# Patient Record
Sex: MALE | Race: WHITE | NOT HISPANIC OR LATINO | URBAN - METROPOLITAN AREA
[De-identification: names, ages, dates, MRNs, and addresses within clinical notes are randomized per-mention and may not be internally consistent; named-entity substitution may affect disease eponyms.]

---

## 2021-09-08 ENCOUNTER — EMERGENCY (EMERGENCY)
Facility: HOSPITAL | Age: 56
LOS: 1 days | Discharge: HOME | End: 2021-09-08
Attending: EMERGENCY MEDICINE
Payer: MEDICARE

## 2021-09-08 VITALS
DIASTOLIC BLOOD PRESSURE: 94 MMHG | SYSTOLIC BLOOD PRESSURE: 156 MMHG | TEMPERATURE: 99 F | RESPIRATION RATE: 19 BRPM | OXYGEN SATURATION: 96 % | HEIGHT: 69 IN | WEIGHT: 177.03 LBS | HEART RATE: 98 BPM

## 2021-09-08 LAB
A1C WITH ESTIMATED AVERAGE GLUCOSE RESULT: 10.7 % — HIGH (ref 4–5.6)
ACETONE SERPL-MCNC: ABNORMAL
ALBUMIN SERPL ELPH-MCNC: 4.3 G/DL — SIGNIFICANT CHANGE UP (ref 3.3–5.2)
ALP SERPL-CCNC: 105 U/L — SIGNIFICANT CHANGE UP (ref 40–120)
ALT FLD-CCNC: 26 U/L — SIGNIFICANT CHANGE UP
ANION GAP SERPL CALC-SCNC: 18 MMOL/L — HIGH (ref 5–17)
ANION GAP SERPL CALC-SCNC: 21 MMOL/L — HIGH (ref 5–17)
APPEARANCE UR: CLEAR — SIGNIFICANT CHANGE UP
AST SERPL-CCNC: 16 U/L — SIGNIFICANT CHANGE UP
BASE EXCESS BLDV CALC-SCNC: -7.2 MMOL/L — LOW (ref -2–3)
BASOPHILS # BLD AUTO: 0.04 K/UL — SIGNIFICANT CHANGE UP (ref 0–0.2)
BASOPHILS NFR BLD AUTO: 0.4 % — SIGNIFICANT CHANGE UP (ref 0–2)
BILIRUB SERPL-MCNC: 0.8 MG/DL — SIGNIFICANT CHANGE UP (ref 0.4–2)
BILIRUB UR-MCNC: NEGATIVE — SIGNIFICANT CHANGE UP
BUN SERPL-MCNC: 10 MG/DL — SIGNIFICANT CHANGE UP (ref 8–20)
BUN SERPL-MCNC: 11 MG/DL — SIGNIFICANT CHANGE UP (ref 8–20)
CALCIUM SERPL-MCNC: 8.4 MG/DL — LOW (ref 8.6–10.2)
CALCIUM SERPL-MCNC: 9.7 MG/DL — SIGNIFICANT CHANGE UP (ref 8.6–10.2)
CHLORIDE SERPL-SCNC: 100 MMOL/L — SIGNIFICANT CHANGE UP (ref 98–107)
CHLORIDE SERPL-SCNC: 97 MMOL/L — LOW (ref 98–107)
CO2 SERPL-SCNC: 16 MMOL/L — LOW (ref 22–29)
CO2 SERPL-SCNC: 17 MMOL/L — LOW (ref 22–29)
COLOR SPEC: YELLOW — SIGNIFICANT CHANGE UP
CREAT SERPL-MCNC: 0.78 MG/DL — SIGNIFICANT CHANGE UP (ref 0.5–1.3)
CREAT SERPL-MCNC: 0.9 MG/DL — SIGNIFICANT CHANGE UP (ref 0.5–1.3)
DIFF PNL FLD: ABNORMAL
EOSINOPHIL # BLD AUTO: 0.1 K/UL — SIGNIFICANT CHANGE UP (ref 0–0.5)
EOSINOPHIL NFR BLD AUTO: 1.1 % — SIGNIFICANT CHANGE UP (ref 0–6)
EPI CELLS # UR: SIGNIFICANT CHANGE UP
ESTIMATED AVERAGE GLUCOSE: 260 MG/DL — HIGH (ref 68–114)
GAS PNL BLDV: SIGNIFICANT CHANGE UP
GLUCOSE BLDC GLUCOMTR-MCNC: 179 MG/DL — HIGH (ref 70–99)
GLUCOSE SERPL-MCNC: 176 MG/DL — HIGH (ref 70–99)
GLUCOSE SERPL-MCNC: 273 MG/DL — HIGH (ref 70–99)
GLUCOSE UR QL: 1000 MG/DL
HCO3 BLDV-SCNC: 19 MMOL/L — LOW (ref 22–29)
HCT VFR BLD CALC: 44.3 % — SIGNIFICANT CHANGE UP (ref 39–50)
HGB BLD-MCNC: 16.1 G/DL — SIGNIFICANT CHANGE UP (ref 13–17)
HYALINE CASTS # UR AUTO: ABNORMAL /LPF
IMM GRANULOCYTES NFR BLD AUTO: 0.8 % — SIGNIFICANT CHANGE UP (ref 0–1.5)
KETONES UR-MCNC: ABNORMAL
LEUKOCYTE ESTERASE UR-ACNC: NEGATIVE — SIGNIFICANT CHANGE UP
LYMPHOCYTES # BLD AUTO: 1.97 K/UL — SIGNIFICANT CHANGE UP (ref 1–3.3)
LYMPHOCYTES # BLD AUTO: 21.4 % — SIGNIFICANT CHANGE UP (ref 13–44)
MAGNESIUM SERPL-MCNC: 1.7 MG/DL — SIGNIFICANT CHANGE UP (ref 1.6–2.6)
MAGNESIUM SERPL-MCNC: 1.9 MG/DL — SIGNIFICANT CHANGE UP (ref 1.6–2.6)
MCHC RBC-ENTMCNC: 31.9 PG — SIGNIFICANT CHANGE UP (ref 27–34)
MCHC RBC-ENTMCNC: 36.3 GM/DL — HIGH (ref 32–36)
MCV RBC AUTO: 87.9 FL — SIGNIFICANT CHANGE UP (ref 80–100)
MONOCYTES # BLD AUTO: 0.55 K/UL — SIGNIFICANT CHANGE UP (ref 0–0.9)
MONOCYTES NFR BLD AUTO: 6 % — SIGNIFICANT CHANGE UP (ref 2–14)
NEUTROPHILS # BLD AUTO: 6.49 K/UL — SIGNIFICANT CHANGE UP (ref 1.8–7.4)
NEUTROPHILS NFR BLD AUTO: 70.3 % — SIGNIFICANT CHANGE UP (ref 43–77)
NITRITE UR-MCNC: NEGATIVE — SIGNIFICANT CHANGE UP
PCO2 BLDV: 39 MMHG — LOW (ref 42–55)
PH BLDV: 7.3 — LOW (ref 7.32–7.43)
PH UR: 5 — SIGNIFICANT CHANGE UP (ref 5–8)
PHOSPHATE SERPL-MCNC: 2.5 MG/DL — SIGNIFICANT CHANGE UP (ref 2.4–4.7)
PLATELET # BLD AUTO: 178 K/UL — SIGNIFICANT CHANGE UP (ref 150–400)
PO2 BLDV: 65 MMHG — HIGH (ref 25–45)
POTASSIUM SERPL-MCNC: 3.9 MMOL/L — SIGNIFICANT CHANGE UP (ref 3.5–5.3)
POTASSIUM SERPL-MCNC: 4.2 MMOL/L — SIGNIFICANT CHANGE UP (ref 3.5–5.3)
POTASSIUM SERPL-SCNC: 3.9 MMOL/L — SIGNIFICANT CHANGE UP (ref 3.5–5.3)
POTASSIUM SERPL-SCNC: 4.2 MMOL/L — SIGNIFICANT CHANGE UP (ref 3.5–5.3)
PROT SERPL-MCNC: 7.6 G/DL — SIGNIFICANT CHANGE UP (ref 6.6–8.7)
PROT UR-MCNC: 30 MG/DL
RBC # BLD: 5.04 M/UL — SIGNIFICANT CHANGE UP (ref 4.2–5.8)
RBC # FLD: 12.2 % — SIGNIFICANT CHANGE UP (ref 10.3–14.5)
RBC CASTS # UR COMP ASSIST: SIGNIFICANT CHANGE UP /HPF (ref 0–4)
SAO2 % BLDV: 93.5 % — SIGNIFICANT CHANGE UP
SODIUM SERPL-SCNC: 133 MMOL/L — LOW (ref 135–145)
SODIUM SERPL-SCNC: 135 MMOL/L — SIGNIFICANT CHANGE UP (ref 135–145)
SP GR SPEC: 1.02 — SIGNIFICANT CHANGE UP (ref 1.01–1.02)
UROBILINOGEN FLD QL: NEGATIVE MG/DL — SIGNIFICANT CHANGE UP
WBC # BLD: 9.22 K/UL — SIGNIFICANT CHANGE UP (ref 3.8–10.5)
WBC # FLD AUTO: 9.22 K/UL — SIGNIFICANT CHANGE UP (ref 3.8–10.5)
WBC UR QL: SIGNIFICANT CHANGE UP

## 2021-09-08 PROCEDURE — 99244 OFF/OP CNSLTJ NEW/EST MOD 40: CPT

## 2021-09-08 PROCEDURE — 99291 CRITICAL CARE FIRST HOUR: CPT

## 2021-09-08 RX ORDER — POTASSIUM CHLORIDE 20 MEQ
40 PACKET (EA) ORAL ONCE
Refills: 0 | Status: COMPLETED | OUTPATIENT
Start: 2021-09-08 | End: 2021-09-08

## 2021-09-08 RX ORDER — DEXTROSE 50 % IN WATER 50 %
15 SYRINGE (ML) INTRAVENOUS ONCE
Refills: 0 | Status: DISCONTINUED | OUTPATIENT
Start: 2021-09-08 | End: 2021-09-13

## 2021-09-08 RX ORDER — SODIUM CHLORIDE 9 MG/ML
2000 INJECTION, SOLUTION INTRAVENOUS ONCE
Refills: 0 | Status: COMPLETED | OUTPATIENT
Start: 2021-09-08 | End: 2021-09-08

## 2021-09-08 RX ORDER — INSULIN GLARGINE 100 [IU]/ML
15 INJECTION, SOLUTION SUBCUTANEOUS ONCE
Refills: 0 | Status: COMPLETED | OUTPATIENT
Start: 2021-09-08 | End: 2021-09-08

## 2021-09-08 RX ORDER — DEXTROSE MONOHYDRATE, SODIUM CHLORIDE, AND POTASSIUM CHLORIDE 50; .745; 4.5 G/1000ML; G/1000ML; G/1000ML
1000 INJECTION, SOLUTION INTRAVENOUS
Refills: 0 | Status: DISCONTINUED | OUTPATIENT
Start: 2021-09-08 | End: 2021-09-08

## 2021-09-08 RX ORDER — SODIUM CHLORIDE 9 MG/ML
1000 INJECTION, SOLUTION INTRAVENOUS ONCE
Refills: 0 | Status: COMPLETED | OUTPATIENT
Start: 2021-09-08 | End: 2021-09-08

## 2021-09-08 RX ORDER — DEXTROSE 50 % IN WATER 50 %
25 SYRINGE (ML) INTRAVENOUS ONCE
Refills: 0 | Status: DISCONTINUED | OUTPATIENT
Start: 2021-09-08 | End: 2021-09-13

## 2021-09-08 RX ORDER — SODIUM CHLORIDE 9 MG/ML
1000 INJECTION, SOLUTION INTRAVENOUS
Refills: 0 | Status: DISCONTINUED | OUTPATIENT
Start: 2021-09-08 | End: 2021-09-13

## 2021-09-08 RX ORDER — GLUCAGON INJECTION, SOLUTION 0.5 MG/.1ML
1 INJECTION, SOLUTION SUBCUTANEOUS ONCE
Refills: 0 | Status: DISCONTINUED | OUTPATIENT
Start: 2021-09-08 | End: 2021-09-13

## 2021-09-08 RX ORDER — METFORMIN HYDROCHLORIDE 850 MG/1
500 TABLET ORAL
Refills: 0 | Status: DISCONTINUED | OUTPATIENT
Start: 2021-09-08 | End: 2021-09-08

## 2021-09-08 RX ORDER — INSULIN LISPRO 100/ML
VIAL (ML) SUBCUTANEOUS
Refills: 0 | Status: DISCONTINUED | OUTPATIENT
Start: 2021-09-08 | End: 2021-09-13

## 2021-09-08 RX ORDER — DEXTROSE 50 % IN WATER 50 %
12.5 SYRINGE (ML) INTRAVENOUS ONCE
Refills: 0 | Status: DISCONTINUED | OUTPATIENT
Start: 2021-09-08 | End: 2021-09-13

## 2021-09-08 RX ORDER — INSULIN HUMAN 100 [IU]/ML
8 INJECTION, SOLUTION SUBCUTANEOUS
Qty: 100 | Refills: 0 | Status: DISCONTINUED | OUTPATIENT
Start: 2021-09-08 | End: 2021-09-08

## 2021-09-08 RX ORDER — INSULIN HUMAN 100 [IU]/ML
8 INJECTION, SOLUTION SUBCUTANEOUS ONCE
Refills: 0 | Status: COMPLETED | OUTPATIENT
Start: 2021-09-08 | End: 2021-09-08

## 2021-09-08 RX ADMIN — INSULIN HUMAN 8 UNIT(S)/HR: 100 INJECTION, SOLUTION SUBCUTANEOUS at 18:57

## 2021-09-08 RX ADMIN — SODIUM CHLORIDE 1000 MILLILITER(S): 9 INJECTION, SOLUTION INTRAVENOUS at 18:57

## 2021-09-08 RX ADMIN — INSULIN HUMAN 8 UNIT(S): 100 INJECTION, SOLUTION SUBCUTANEOUS at 18:19

## 2021-09-08 RX ADMIN — SODIUM CHLORIDE 1000 MILLILITER(S): 9 INJECTION, SOLUTION INTRAVENOUS at 18:19

## 2021-09-08 RX ADMIN — Medication 2: at 23:15

## 2021-09-08 RX ADMIN — SODIUM CHLORIDE 1000 MILLILITER(S): 9 INJECTION, SOLUTION INTRAVENOUS at 16:40

## 2021-09-08 RX ADMIN — SODIUM CHLORIDE 150 MILLILITER(S): 9 INJECTION, SOLUTION INTRAVENOUS at 19:32

## 2021-09-08 RX ADMIN — Medication 40 MILLIEQUIVALENT(S): at 19:04

## 2021-09-08 NOTE — CONSULT NOTE ADULT - ASSESSMENT
55M new onset diabetes mild DKA with strong family history of diabetes.  Received 2L IVF and 8units RHI push.  BG down to 176 still receiving IVF bolus, covid pending.      DKA  new onset diabetes  blurred vision    Finish additional 2L IVF bolus start IVF LR 150cc/hr  BG q1h  repeat lytes 9pm if gap unclosed will start RHI gtt and admit to ICU, if closed can transition to sliding scale with standing lantus and tx to floor.  check and replace K Mag Phos as needed  will give lantus 15units now  endocrine consult  ophthalmology consult  MICU will continue to follow  d/w Dr Boss in agreement with plan     55M new onset diabetes mild DKA with strong family history of diabetes.  Received 2L IVF and 8units RHI push.  BG down to 176 still receiving IVF bolus, covid pending.      DKA  new onset diabetes  blurred vision    Finish additional 2L IVF bolus start IVF LR 150cc/hr  BG q1h  repeat lytes 9pm if gap unclosed will start RHI gtt and admit to ICU, if closed can transition to sliding scale with standing lantus and tx to floor.  check and replace K Mag Phos as needed  will give lantus 15units now per Dr. Boss  endocrine consult  ophthalmology consult  MICU will continue to follow  d/w Dr Boss in agreement with plan

## 2021-09-08 NOTE — CONSULT NOTE ADULT - SUBJECTIVE AND OBJECTIVE BOX
REASON FOR CONSULT: new onset DM DKA    CONSULT REQUESTED BY: Dr. Rylan MD    Patient is a 55y old  Male who presents with a chief complaint of new onset diabetes.  Pt reports 2 week history of blurred vision, polyuria, polydipsia.  Presents today for worsening symptoms.  Denies CP SOB N/V/D, HA.    PAST MEDICAL & SURGICAL HISTORY:  No pertinent past medical history    No significant past surgical history      Allergies    Allergy Status Unknown    Intolerances      FAMILY HISTORY:      Review of Systems: all other ros negative except as noted in hpi  CONSTITUTIONAL: No fever, chills, or fatigue  EYES: No eye pain, visual disturbances, or discharge  ENMT:  No difficulty hearing, tinnitus, vertigo; No sinus or throat pain +increase thirst  NECK: No pain or stiffness  RESPIRATORY: No cough, wheezing, chills or hemoptysis; No shortness of breath  CARDIOVASCULAR: No chest pain, palpitations, dizziness, or leg swelling  GASTROINTESTINAL: No abdominal or epigastric pain. No nausea, vomiting, or hematemesis; No diarrhea or constipation. No melena or hematochezia.  GENITOURINARY: No dysuria, frequency, hematuria, or incontinence +frequency  NEUROLOGICAL: No headaches, memory loss, loss of strength, numbness, or tremors  SKIN: No itching, burning, rashes, or lesions   MUSCULOSKELETAL: No joint pain or swelling; No muscle, back, or extremity pain  PSYCHIATRIC: No depression, anxiety, mood swings, or difficulty sleeping      Medications:                    lactated ringers. 1000 milliLiter(s) IV Continuous <Continuous>                ICU Vital Signs Last 24 Hrs  T(C): 36.8 (08 Sep 2021 19:30), Max: 37 (08 Sep 2021 13:53)  T(F): 98.2 (08 Sep 2021 19:30), Max: 98.6 (08 Sep 2021 13:53)  HR: 86 (08 Sep 2021 19:30) (86 - 98)  BP: 177/93 (08 Sep 2021 19:30) (147/78 - 177/93)  BP(mean): --  ABP: --  ABP(mean): --  RR: 20 (08 Sep 2021 19:30) (19 - 20)  SpO2: 98% (08 Sep 2021 19:30) (96% - 98%)    Vital Signs Last 24 Hrs  T(C): 36.8 (08 Sep 2021 19:30), Max: 37 (08 Sep 2021 13:53)  T(F): 98.2 (08 Sep 2021 19:30), Max: 98.6 (08 Sep 2021 13:53)  HR: 86 (08 Sep 2021 19:30) (86 - 98)  BP: 177/93 (08 Sep 2021 19:30) (147/78 - 177/93)  BP(mean): --  RR: 20 (08 Sep 2021 19:30) (19 - 20)  SpO2: 98% (08 Sep 2021 19:30) (96% - 98%)        I&O's Detail        LABS:                        16.1   9.22  )-----------( 178      ( 08 Sep 2021 16:45 )             44.3     09    133<L>  |  97<L>  |  11.0  ----------------------------<  273<H>  4.2   |  16.0<L>  |  0.90    Ca    9.7      08 Sep 2021 16:45  Mg     1.9     09-08    TPro  7.6  /  Alb  4.3  /  TBili  0.8  /  DBili  x   /  AST  16  /  ALT  26  /  AlkPhos  105  09-08          CAPILLARY BLOOD GLUCOSE      POCT Blood Glucose.: 176 mg/dL (08 Sep 2021 19:06)      Urinalysis Basic - ( 08 Sep 2021 16:49 )    Color: Yellow / Appearance: Clear / S.025 / pH: x  Gluc: x / Ketone: Large  / Bili: Negative / Urobili: Negative mg/dL   Blood: x / Protein: 30 mg/dL / Nitrite: Negative   Leuk Esterase: Negative / RBC: 0-2 /HPF / WBC 0-2   Sq Epi: x / Non Sq Epi: Occasional / Bacteria: x      CULTURES:      Physical Examination:    General: No acute distress.  Alert, oriented, interactive, nonfocal    HEENT: Pupils equal, reactive to light.  Symmetric.    PULM: Clear to auscultation bilaterally, no significant sputum production    CVS: Regular rate and rhythm, no murmurs, rubs, or gallops    ABD: Soft, nondistended, nontender, normoactive bowel sounds, no masses    EXT: No edema, nontender    SKIN: Warm and well perfused, no rashes noted.        CRITICAL CARE TIME SPENT: 30 minutes

## 2021-09-08 NOTE — ED ADULT NURSE NOTE - NSIMPLEMENTINTERV_GEN_ALL_ED
Implemented All Fall Risk Interventions:  [place] to call system. Call bell, personal items and telephone within reach. Instruct patient to call for assistance. Room bathroom lighting operational. Non-slip footwear when patient is off stretcher. Physically safe environment: no spills, clutter or unnecessary equipment. Stretcher in lowest position, wheels locked, appropriate side rails in place. Provide visual cue, wrist band, yellow gown, etc. Monitor gait and stability. Monitor for mental status changes and reorient to person, place, and time. Review medications for side effects contributing to fall risk. Reinforce activity limits and safety measures with patient and family. Implemented All Universal Safety Interventions:  [place] to call system. Call bell, personal items and telephone within reach. Instruct patient to call for assistance. Room bathroom lighting operational. Non-slip footwear when patient is off stretcher. Physically safe environment: no spills, clutter or unnecessary equipment. Stretcher in lowest position, wheels locked, appropriate side rails in place.

## 2021-09-08 NOTE — ED CDU PROVIDER INITIAL DAY NOTE - OBJECTIVE STATEMENT
56yo M with no PMH presents to the ED c/o increased thirst, blurry vision and weight loss over the course of the past 2 weeks. Patient with strong family history of DM, never bene diagnosed with DM in the past. Last time he saw his PCP 2 years ago. Patient found to have sugars of 318 in triage. Mild anion gap, with small acetone, PH 7.3. Improvement of sugars to 176 and anion gap closed to 18. Seen by ICU who recommended Lantus 15 units q nightly. Patient without abdominal pain, palpitations, nausea, vomiting, fevers, chills, shortness of breath, chest pain.

## 2021-09-08 NOTE — ED PROVIDER NOTE - CLINICAL SUMMARY MEDICAL DECISION MAKING FREE TEXT BOX
likely new onset DM, well appearing, will check labs with a1c. does not want to stay overnight. if necessary patient wife is RN. will be able to assist in insulin injections at home. and has close outpt Follow up

## 2021-09-08 NOTE — ED PROVIDER NOTE - NS ED ATTENDING STATEMENT MOD
Attending Only I have personally provided the amount of critical care time documented below concurrently with the resident/fellow.  This time excludes time spent on separate procedures and time spent teaching. I have reviewed the resident’s / fellow’s documentation and I agree with the history, exam, and assessment and plan of care. I have personally provided the amount of critical care time documented below excluding time spent on separate procedures.

## 2021-09-08 NOTE — ED PROVIDER NOTE - OBJECTIVE STATEMENT
54yo M with no PMH, last 2 weeks intermittent blurry vision, increased thirst and frequent urination, +FH DM. no abd pain. no CP/SOB. no fevers.

## 2021-09-08 NOTE — ED PROVIDER NOTE - NS ED ROS FT
ROS: no CP/SOB. no cough. no fever. no n/v/d/c. no abd pain. no rash. no bleeding. +urinary complaints. no weakness. +vision changes. no HA. no neck/back pain. no extremity swelling/deformity. No change in mental status.

## 2021-09-08 NOTE — ED ADULT NURSE NOTE - CAS ELECT INFOMATION PROVIDED
DC instructions provided and reviewed with pt by TEJAL Braahona. Patient in understanding of all dc instructions. No further questions for the RN regarding DC. VSS. Ambulatory./DC instructions

## 2021-09-08 NOTE — ED ADULT NURSE NOTE - OBJECTIVE STATEMENT
Assumed care at 1545 pt co weakness, unable to urinate since 0900 am and feeling like he was going to pass out, as per wife pts nurse took his BP and was very low. pt denies any chest pain, SOB, fevers, chills. Assumed care at 1635 pt co excessive thirst, frequent urination for a couple of days, pt denies any fevers, chills, chest pain SOB.

## 2021-09-08 NOTE — ED ADULT TRIAGE NOTE - CHIEF COMPLAINT QUOTE
Pt. c/o increased thirst, weight loss, increased urination and blurred vision x 2 weeks.  Pt. with no history of DM but significant family history of DM.  FS in triage 318

## 2021-09-08 NOTE — ED CDU PROVIDER INITIAL DAY NOTE - ATTENDING CONTRIBUTION TO CARE
I, Charlie Rutherford, performed a face to face bedside interview with this patient regarding history of present illness, review of symptoms and relevant past medical, social and family history.  I completed an independent physical examination. I have communicated the patient’s plan of care and disposition with the ACP.  55 year old male with no PMH presents with polydipsia and weight loss, found to have new onset DM with mild DKA, DKA resolved, placed on obs for glycemic control  Gen: NAD, well appearing  CV: RRR  Pul: CTA b/l  Abd: Soft, non-distended, non-tender  Neuro: no focal deficits

## 2021-09-08 NOTE — ED CDU PROVIDER INITIAL DAY NOTE - MEDICAL DECISION MAKING DETAILS
56yo M with no PMH presents to the ED c/o increased thirst, blurry vision and weight loss over the course of the past 2 weeks.    New onset Type 2 DM- family hx of DM, never been diagnosed last saw PCP 2 years ago. Initial BS of 318, anion gap of 21, small acetone, PH 7.3. Improvement of sugars and anion gap, hemoglobin a1c of 10.7, Seen by ICU, no need for insulin drip at this time, Recommended 15 units of lantus q nightly, will place on sliding scale, DM education, patient to follow up with endocrinology and ophthalmology and Podiatry following discharge, finger stick q 4 hours, repeat bmp in the am, continue on lactated ringers

## 2021-09-08 NOTE — ED PROVIDER NOTE - PROGRESS NOTE DETAILS
patient with mild DKA, will start insulin gtt, D5 gtt, MICU consulted -Rylan DO significant improvement with hydration, IV insulin and no gtt. will give lantus. obs -Slowey DO

## 2021-09-09 VITALS
OXYGEN SATURATION: 97 % | TEMPERATURE: 99 F | HEART RATE: 81 BPM | DIASTOLIC BLOOD PRESSURE: 77 MMHG | SYSTOLIC BLOOD PRESSURE: 129 MMHG | RESPIRATION RATE: 18 BRPM

## 2021-09-09 LAB
ANION GAP SERPL CALC-SCNC: 17 MMOL/L — SIGNIFICANT CHANGE UP (ref 5–17)
BUN SERPL-MCNC: 10.1 MG/DL — SIGNIFICANT CHANGE UP (ref 8–20)
C PEPTIDE SERPL-MCNC: 3.3 NG/ML — SIGNIFICANT CHANGE UP (ref 1.1–4.4)
CALCIUM SERPL-MCNC: 8.3 MG/DL — LOW (ref 8.6–10.2)
CHLORIDE SERPL-SCNC: 99 MMOL/L — SIGNIFICANT CHANGE UP (ref 98–107)
CO2 SERPL-SCNC: 16 MMOL/L — LOW (ref 22–29)
CREAT SERPL-MCNC: 0.76 MG/DL — SIGNIFICANT CHANGE UP (ref 0.5–1.3)
GLUCOSE BLDC GLUCOMTR-MCNC: 170 MG/DL — HIGH (ref 70–99)
GLUCOSE BLDC GLUCOMTR-MCNC: 184 MG/DL — HIGH (ref 70–99)
GLUCOSE BLDC GLUCOMTR-MCNC: 215 MG/DL — HIGH (ref 70–99)
GLUCOSE SERPL-MCNC: 241 MG/DL — HIGH (ref 70–99)
POTASSIUM SERPL-MCNC: 4.6 MMOL/L — SIGNIFICANT CHANGE UP (ref 3.5–5.3)
POTASSIUM SERPL-SCNC: 4.6 MMOL/L — SIGNIFICANT CHANGE UP (ref 3.5–5.3)
SARS-COV-2 RNA SPEC QL NAA+PROBE: SIGNIFICANT CHANGE UP
SODIUM SERPL-SCNC: 132 MMOL/L — LOW (ref 135–145)

## 2021-09-09 PROCEDURE — 99291 CRITICAL CARE FIRST HOUR: CPT | Mod: 25

## 2021-09-09 PROCEDURE — U0005: CPT

## 2021-09-09 PROCEDURE — U0003: CPT

## 2021-09-09 PROCEDURE — 36415 COLL VENOUS BLD VENIPUNCTURE: CPT

## 2021-09-09 PROCEDURE — 83735 ASSAY OF MAGNESIUM: CPT

## 2021-09-09 PROCEDURE — 96376 TX/PRO/DX INJ SAME DRUG ADON: CPT

## 2021-09-09 PROCEDURE — 80053 COMPREHEN METABOLIC PANEL: CPT

## 2021-09-09 PROCEDURE — 82803 BLOOD GASES ANY COMBINATION: CPT

## 2021-09-09 PROCEDURE — 82962 GLUCOSE BLOOD TEST: CPT

## 2021-09-09 PROCEDURE — 85025 COMPLETE CBC W/AUTO DIFF WBC: CPT

## 2021-09-09 PROCEDURE — 84681 ASSAY OF C-PEPTIDE: CPT

## 2021-09-09 PROCEDURE — 82009 KETONE BODYS QUAL: CPT

## 2021-09-09 PROCEDURE — 81001 URINALYSIS AUTO W/SCOPE: CPT

## 2021-09-09 PROCEDURE — 84100 ASSAY OF PHOSPHORUS: CPT

## 2021-09-09 PROCEDURE — G0378: CPT

## 2021-09-09 PROCEDURE — 80048 BASIC METABOLIC PNL TOTAL CA: CPT

## 2021-09-09 PROCEDURE — 96374 THER/PROPH/DIAG INJ IV PUSH: CPT

## 2021-09-09 PROCEDURE — 83036 HEMOGLOBIN GLYCOSYLATED A1C: CPT

## 2021-09-09 RX ORDER — INSULIN LISPRO 100/ML
4 VIAL (ML) SUBCUTANEOUS
Refills: 0 | Status: DISCONTINUED | OUTPATIENT
Start: 2021-09-09 | End: 2021-09-13

## 2021-09-09 RX ORDER — INSULIN GLARGINE 100 [IU]/ML
20 INJECTION, SOLUTION SUBCUTANEOUS AT BEDTIME
Refills: 0 | Status: DISCONTINUED | OUTPATIENT
Start: 2021-09-09 | End: 2021-09-13

## 2021-09-09 RX ORDER — ENOXAPARIN SODIUM 100 MG/ML
20 INJECTION SUBCUTANEOUS
Qty: 6 | Refills: 0
Start: 2021-09-09 | End: 2021-10-08

## 2021-09-09 RX ORDER — INSULIN LISPRO 100/ML
4 VIAL (ML) SUBCUTANEOUS
Qty: 4 | Refills: 0
Start: 2021-09-09 | End: 2021-10-08

## 2021-09-09 RX ADMIN — Medication 4 UNIT(S): at 16:54

## 2021-09-09 RX ADMIN — Medication 2: at 16:54

## 2021-09-09 RX ADMIN — Medication 4 UNIT(S): at 12:31

## 2021-09-09 RX ADMIN — SODIUM CHLORIDE 150 MILLILITER(S): 9 INJECTION, SOLUTION INTRAVENOUS at 12:31

## 2021-09-09 RX ADMIN — Medication 2: at 12:32

## 2021-09-09 RX ADMIN — Medication 4: at 07:58

## 2021-09-09 NOTE — CHART NOTE - NSCHARTNOTEFT_GEN_A_CORE
CM met w/ pt at bs.  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  Pt states he will be able to afford his meds no matter how expensive they are.  CM informed ER MD of same.  No further CM needs identified.

## 2021-09-09 NOTE — ED ADULT NURSE REASSESSMENT NOTE - NS ED NURSE REASSESS COMMENT FT1
PAtient is alert and oriented X4 from home. Patient has been sleeping on and off at this time. Patient VSS. pt educated on plan of care, pt able to successfully teach back plan of care to RN, RN will continue to reeducate pt during hospital stay.
Assumed care of pt at 19:15 as stated in report from MINO Lambert. Charting as noted. Patient A&O x3, denies pain/discomfort, VSS, denies CP/SOB. Updated on the plan of care. Call bell within reach, bed locked in lowest position. IV site flushed w/ NS. No redness, swelling or pain noted to site. No signs of acute distress noted, safety maintained. , remains on CM (NSR) w/ LR infusing.
Assumed care of the patient at 0730. Verbal report received from Charlie HERZOG ED. Patient A&Ox4. No s/s of acute distress or pain. Patient demonstrated correct use of glucometer and self injecting correct amount of insulin at correct site with supervision of RN. VSS. IVF infusing as per orders. Patient pending DM educator and bmp results. Patient in understanding of plan of care. Patient with no further questions for the RN. Resting in comfort. Call bell within reach and encouraged to use when assistance needed. Will continue to monitor.

## 2021-09-09 NOTE — ADVANCED PRACTICE NURSE CONSULT - ASSESSMENT
went to see pt after lunch pt is a+ox3 c/o 0 pain resting comfortably in bed. pt states for the last 3 weeks he had polyurea polydepsia and polyphagia. he was not feeling tired. pt was educated about the 2 types of  went to see pt after lunch pt is a+ox3 c/o 0 pain resting comfortably in bed. pt states for the last 3 weeks he had polyurea polydepsia and polyphagia. he was not feeling tired. pt was educated about the 2 types of diabetes and the importance of using insulin at this time. pt was educated about the 2 types of inuslin he will be going home on, their different schedule and action. pt verbalized understanding. also educated bout ss of hypoglycemia and treatment pt verbalized understanding. pt stated his brother was just dx w type 1 diabetes. discussed healthy eating habits and advised him not to skip meals. the plate method was used to teach portion. written material about diabetes self management education provided. pt has a fu appointment w dr vu in [place] on 9/23/2021 at 10 am.

## 2021-09-09 NOTE — CONSULT NOTE ADULT - SUBJECTIVE AND OBJECTIVE BOX
HPI:      PAST MEDICAL & SURGICAL HISTORY:  No pertinent past medical history    No significant past surgical history      FAMILY HISTORY:  FH: type 2 diabetes (Father, Mother)        SOCIAL HISTORY:    REVIEW OF SYSTEMS:    Constitutional: No fever, no chills, no change in weight.  Eyes: No eye swelling,no  blurry vision, no redness, no loss of vision.  Neck: No neck pain, no change in voice.  Lungs: No shortness of breath, no wheezing, no cough  CV: No chest pain, no palpitations, no pain with walking.  GI: No nausea, no vomiting, no constipation, no diarrhea, no abdominal pain  : No urinary frequency, no blood in urine  Musculoskeletal: No muscle pain, no joint pain, no swelling.  Skin: No rash, no infections.  Neurologic: No headaches, no weakness, no burning or pain in feet, no tremor.  Endocrine: No heat intolerance, no cold intolerance  Psych: No depression, no anxiety    MEDICATIONS  (STANDING):  dextrose 40% Gel 15 Gram(s) Oral once  dextrose 50% Injectable 25 Gram(s) IV Push once  dextrose 50% Injectable 12.5 Gram(s) IV Push once  dextrose 50% Injectable 25 Gram(s) IV Push once  glucagon  Injectable 1 milliGRAM(s) IntraMuscular once  insulin lispro (ADMELOG) corrective regimen sliding scale   SubCutaneous three times a day before meals  lactated ringers. 1000 milliLiter(s) (150 mL/Hr) IV Continuous <Continuous>    Allergies  Allergy Status Unknown    CAPILLARY BLOOD GLUCOSE  POCT Blood Glucose.: 215 mg/dL (09 Sep 2021 07:54)      PHYSICAL EXAM:    Vital Signs Last 24 Hrs  T(C): 36.8 (09 Sep 2021 05:46), Max: 37 (08 Sep 2021 13:53)  T(F): 98.3 (09 Sep 2021 05:46), Max: 98.6 (08 Sep 2021 13:53)  HR: 80 (09 Sep 2021 05:46) (80 - 98)  BP: 117/73 (09 Sep 2021 05:46) (117/73 - 177/93)  BP(mean): --  RR: 18 (09 Sep 2021 05:46) (18 - 20)  SpO2: 97% (09 Sep 2021 05:46) (96% - 98%)    General appearance: Well developed, well nourished.  Eyes: Pupils equal and reactive to light. EOM full. No exophthalmos.  Neck: Trachea midline. No thyroid enlargement.  Lungs: Normal respiratory excursion. Lungs clear.  CV: Regular cardiac rhythm. No murmur. Carotid and pedal pulses intact.  Abdomen: Soft, non tender, no organomegaly or mass.  Musculoskeletal: No cyanosis, clubbing, or edema. No pedal lesions.  Skin: Warm and moist. No rash.   Neuro: Cranial nerves intact. Normal motor and sensory function.   Psych: Normal affect, good judgement.    LABS:                        16.1   9.22  )-----------( 178      ( 08 Sep 2021 16:45 )             44.3     -    132<L>  |  99  |  10.1  ----------------------------<  241<H>  4.6   |  16.0<L>  |  0.76    Ca    8.3<L>      09 Sep 2021 08:09  Phos  2.5     09-  Mg     1.7     -    TPro  7.6  /  Alb  4.3  /  TBili  0.8  /  DBili  x   /  AST  16  /  ALT  26  /  AlkPhos  105  09-08    Urinalysis Basic - ( 08 Sep 2021 16:49 )    Color: Yellow / Appearance: Clear / S.025 / pH: x  Gluc: x / Ketone: Large  / Bili: Negative / Urobili: Negative mg/dL   Blood: x / Protein: 30 mg/dL / Nitrite: Negative   Leuk Esterase: Negative / RBC: 0-2 /HPF / WBC 0-2   Sq Epi: x / Non Sq Epi: Occasional / Bacteria: x      LIVER FUNCTIONS - ( 08 Sep 2021 16:45 )  Alb: 4.3 g/dL / Pro: 7.6 g/dL / ALK PHOS: 105 U/L / ALT: 26 U/L / AST: 16 U/L / GGT: x                HPI:  56yo M with no PMH, last 2 weeks intermittent blurry vision, increased thirst and frequent urination, no abd pain. no CP/SOB. no fevers. he has multiple family members with diabetes. He had not followedup with a doctor in 3 years.      PAST MEDICAL & SURGICAL HISTORY:  No pertinent past medical history  No significant past surgical history    FAMILY HISTORY:  FH: type 2 diabetes (Father, Mother)    SOCIAL HISTORY: has girlfriend  no tobacco no drugs, no etoh    REVIEW OF SYSTEMS:  Constitutional: No fever, no chills, lost 10 lbs in 3 weeks .  Eyes: No eye swelling, has blurry vision, no redness, no loss of vision.  Neck: No neck pain, no change in voice.  Lungs: No shortness of breath, no wheezing, no cough  CV: No chest pain, no palpitations  GI: No nausea, no vomiting, no constipation, no diarrhea, no abdominal pain  : polyuria polydipsia, no blood in urine  Musculoskeletal: No muscle pain, no joint pain, no swelling.  Skin: No rash, no infections.  Neurologic: No headaches, no weakness  Endocrine: No heat intolerance, no cold intolerance  Psych: No depression, no anxiety    MEDICATIONS  (STANDING):  dextrose 40% Gel 15 Gram(s) Oral once  dextrose 50% Injectable 25 Gram(s) IV Push once  dextrose 50% Injectable 12.5 Gram(s) IV Push once  dextrose 50% Injectable 25 Gram(s) IV Push once  glucagon  Injectable 1 milliGRAM(s) IntraMuscular once  insulin lispro (ADMELOG) corrective regimen sliding scale   SubCutaneous three times a day before meals  lactated ringers. 1000 milliLiter(s) (150 mL/Hr) IV Continuous <Continuous>    Allergies  Allergy Status Unknown    CAPILLARY BLOOD GLUCOSE  POCT Blood Glucose.: 215 mg/dL (09 Sep 2021 07:54)      PHYSICAL EXAM:    Vital Signs Last 24 Hrs  T(C): 36.8 (09 Sep 2021 05:46), Max: 37 (08 Sep 2021 13:53)  T(F): 98.3 (09 Sep 2021 05:46), Max: 98.6 (08 Sep 2021 13:53)  HR: 80 (09 Sep 2021 05:46) (80 - 98)  BP: 117/73 (09 Sep 2021 05:46) (117/73 - 177/93)  BP(mean): --  RR: 18 (09 Sep 2021 05:46) (18 - 20)  SpO2: 97% (09 Sep 2021 05:46) (96% - 98%)    General appearance: Well developed, well nourished.  Eyes: Pupils equal and reactive to light. EOM full. No exophthalmos.  Neck: Trachea midline. No thyroid enlargement.  Lungs: Normal respiratory excursion. Lungs clear.  CV: Regular cardiac rhythm. No murmur. Carotid and pedal pulses intact.  Abdomen: Soft, non tender, no organomegaly or mass.  Musculoskeletal: No cyanosis, clubbing, or edema. No pedal lesions.  Skin: Warm and moist. No rash.   Neuro: Cranial nerves intact. Normal motor and sensory function.   Psych: Normal affect, good judgement.    LABS:                        16.1   9.22  )-----------( 178      ( 08 Sep 2021 16:45 )             44.3     09-    132<L>  |  99  |  10.1  ----------------------------<  241<H>  4.6   |  16.0<L>  |  0.76    Ca    8.3<L>      09 Sep 2021 08:09  Phos  2.5     09-08  Mg     1.7     09-08    TPro  7.6  /  Alb  4.3  /  TBili  0.8  /  DBili  x   /  AST  16  /  ALT  26  /  AlkPhos  105  09-08    Urinalysis Basic - ( 08 Sep 2021 16:49 )    Color: Yellow / Appearance: Clear / S.025 / pH: x  Gluc: x / Ketone: Large  / Bili: Negative / Urobili: Negative mg/dL   Blood: x / Protein: 30 mg/dL / Nitrite: Negative   Leuk Esterase: Negative / RBC: 0-2 /HPF / WBC 0-2   Sq Epi: x / Non Sq Epi: Occasional / Bacteria: x      LIVER FUNCTIONS - ( 08 Sep 2021 16:45 )  Alb: 4.3 g/dL / Pro: 7.6 g/dL / ALK PHOS: 105 U/L / ALT: 26 U/L / AST: 16 U/L / GGT: x

## 2021-09-09 NOTE — ED CDU PROVIDER DISPOSITION NOTE - CARE PROVIDER_API CALL
Megan Coronado)  EndocrinologyMetabDiabetes  [address], [place] [id number]  Phone: [phone]  Fax: [phone]  Follow Up Time:

## 2021-09-09 NOTE — CONSULT NOTE ADULT - ASSESSMENT
Pt 56 yo male with no significant past medical history comes to [facility] for polyuria, polydipsia , blurry vision and foudn to have new onset DM and DKA. Treated with insulin gtt and iv fluids and gap resolved.   new A1c 10.7     DM2 probably type 2 but i would send out AVELINA, ICA and C peptide   continue iv fluids  cc/hr to correct the free water deficit   check bgs actid hs   lantus hs per protocol once bicarbonate normalized AG closed now.   monitor electrolytes and correct accordingly.     needs CDE for diet and instructions about meter    needs glucometer and supplies.   followup with endo as outpatient.     Pseudohypona: secondary to DKA  continue iv fluids. Monitor CMP

## 2021-09-09 NOTE — ED CDU PROVIDER DISPOSITION NOTE - PATIENT PORTAL LINK FT
You can access the FollowMyHealth Patient Portal offered by [facility] by registering at the following website: [email or web address]. By joining [email or web address]’s FollowMyHealth portal, you will also be able to view your health information using other applications (apps) compatible with our system.

## 2021-09-09 NOTE — ED ADULT NURSE REASSESSMENT NOTE - COMFORT CARE
ambulated to bathroom/meal provided/plan of care explained/po fluids offered/repositioned/wait time explained
ambulated to bathroom/meal provided/plan of care explained/po fluids offered/repositioned/wait time explained

## 2021-09-09 NOTE — ED CDU PROVIDER SUBSEQUENT DAY NOTE - ATTENDING CONTRIBUTION TO CARE
seen on morning rounds:  reports feeling fine at present.  Notes increased thirst and increased urination for the past 2-3 weeks with more recent blurring of vision.  Strong FMHx of DM.  Initially treated in ED with IV insulin for mild DKA, consult by ICU and downgraded due to closing anion gap.  Treated with lantus 15units last night: had blood sugar 241 this morning.   Case d/w diabetes educator and lantus dose increased to 15 units at bedtime with premeal insulin 4units WITH sliding scale.

## 2021-09-09 NOTE — ED CDU PROVIDER DISPOSITION NOTE - ATTENDING CONTRIBUTION TO CARE
The patient seen and examined    Hyperglycemia    I, Jona Patton, performed the initial face to face bedside interview with this patient regarding history of present illness, review of symptoms and relevant past medical, social and family history.  I completed an independent physical examination.  I was the initial provider who evaluated this patient. I have signed out the follow up of any pending tests (i.e. labs, radiological studies) to the ACP.  I have communicated the patient’s plan of care and disposition with the ACP.

## 2021-09-09 NOTE — ADVANCED PRACTICE NURSE CONSULT - RECOMMEDATIONS
continue diabetes self management education  pt to inject all insulin doses while in the hospital using prefilled insulin syringe and rn supervision  inuslin pen teaching  glucose meter teching  pls consider dc pt on lantus 20 units qhs and  admelog 4 units before meals  deepti needles for 4xdaily glucose meter strips and lancets for 4xdaily  cc - pls task pt to diabetes wellness out pt

## 2021-09-09 NOTE — ED CDU PROVIDER DISPOSITION NOTE - CLINICAL COURSE
55 year old male with no PMHx presents to the ED for polydipsia and polyuria x 2 weeks. Pt initially with anion gap and slight acetones, gap closed after insulin drip and IVF. Pt placed in obs for hyperglycemia protocol. Pt seen by DM educator and endocrine who recommended 20 units of Lantus and admelog 4 units before meals. Bgl improving to 100s while in obs. Pt feeling much better, scripts for meds sent to [facility]. Return precautions discussed. Pt to follow up with endocrine as out pt.

## 2021-09-09 NOTE — ED CDU PROVIDER SUBSEQUENT DAY NOTE - HISTORY
pt with no acute events overnight, finger stick improving, finger stick of 176, gap closing, pending repeat bmp, DM education

## 2021-09-09 NOTE — ED CDU PROVIDER DISPOSITION NOTE - NSFOLLOWUPINSTRUCTIONS_ED_ALL_ED_FT
Follow up with endocrine within 1-2 weeks   Take medication as prescribed   Follow up with primary medical doctor in 2-3 days     Type 2 Diabetes Mellitus, Self-Care, Adult    When you have type 2 diabetes (type 2 diabetes mellitus), you must make sure your blood sugar (glucose) stays in a healthy range. You can do this with:  •Nutrition.    •Exercise.    •Lifestyle changes.    •Medicines or insulin, if needed.    •Support from your doctors and others.    What are the risks?    Having diabetes can raise your risk for other long-term (chronic) health problems. You may get medicines to help prevent these problems.      How to stay aware of blood sugar     •Check your blood sugar level every day, as often as told.    •Have your A1C (hemoglobin A1C) level checked two or more times a year. Have it checked more often if told.    •Your doctor will set personal treatment goals for you. In general, you should have these blood sugar levels:  •Before meals: 80–130 mg/dL (4.4–7.2 mmol/L).      •After meals: below 180 mg/dL (10 mmol/L).    •A1C: less than 7%.      How to manage high and low blood sugar    Symptoms of high blood sugar   High blood sugar is also called hyperglycemia. Know the symptoms of high blood sugar. These may include:  •More thirst.    •Hunger.    •Feeling very tired.    •Needing to pee (urinate) more often than normal.    •Seeing things blurry.    Symptoms of low blood sugar  Low blood sugar is also called hypoglycemia. This is when blood sugar is at or below 70 mg/dL (3.9 mmol/L). Symptoms may include:  •Hunger.    •Feeling worried or nervous (anxious).    •Feeling sweaty and cold to the touch (clammy).    •Being dizzy or light-headed.    •Feeling sleepy.    •A fast heartbeat.    •Feeling grouchy (irritable).    •Tingling or loss of feeling (numbness) around your mouth, lips, or tongue.  •Restless sleep.    Diabetes medicines can cause low blood sugar. You are more at risk:  •While you exercise.      •After exercise.      •During sleep.      •When you are sick.      •When you skip meals or do not eat for a long time.      Treating low blood sugar    If you think you have low blood sugar, eat or drink something sugary right away. Keep 15 grams of a fast-acting carb (carbohydrate) with you all the time. Make sure your family and friends know how to treat you if you cannot treat yourself.    Treating very low blood sugar    Severe hypoglycemia is when your blood sugar is at or below 54 mg/dL (3 mmol/L). Severe hypoglycemia is an emergency. Do not wait to see if the symptoms will go away. Get medical help right away. Call your local emergency services (911 in the [place] Do not drive yourself to the hospital.    You may need a glucagon shot if you have very low blood sugar and you cannot eat or drink. Have a family member or friend learn how to check your blood sugar and how to give you a glucagon shot. Ask your doctor if you should have a kit for glucagon shots.      Follow these instructions at home:    Medicines     •Take diabetes medicines as told. If your doctor prescribed insulin or diabetes medicines, take them each day.      • Do not run out of insulin or other medicines. Plan ahead.      •If you use insulin, change the amount you take based on how active you are and what foods you eat. Your doctor will tell you how to do this.      •Take over-the-counter and prescription medicines only as told by your doctor.        Eating and drinking    •Eat healthy foods. These include:   •Low-fat (lean) proteins.      •Complex carbs, such as whole grains.      •Fresh fruits and vegetables.      •Low-fat dairy products.      •Healthy fats.        •Meet with a food expert (dietitian) to make an eating plan.      •Follow instructions from your doctor about what you cannot eat or drink.      •Drink enough fluid to keep your pee (urine) pale yellow.      •Keep track of carbs that you eat. Read food labels and learn serving sizes of foods.      •Follow your sick-day plan when you cannot eat or drink as normal. Make this plan with your doctor so it is ready to use.      Activity   •Exercise as told by your doctor. You may need to:  •Do stretching and strength exercises 2 or more times a week.    •Do 150 minutes or more of exercise each week that makes your heart beat faster and makes you sweat.  •Spread out your exercise over 3 or more days a week.      •Do not go more than 2 days in a row without exercise.        •Talk with your doctor before you start a new exercise. Your doctor may tell you to change:  •How much insulin or medicines you take.      •How much food you eat.        Lifestyle     • Do not use any products that contain nicotine or tobacco, such as cigarettes, e-cigarettes, and chewing tobacco. If you need help quitting, ask your doctor.    •If you drink alcohol and your doctor says alcohol is safe for you:•Limit how much you use to:  •0–1 drink a day for women who are not pregnant.      •0–2 drinks a day for men.        •Be aware of how much alcohol is in your drink. In the [place] one drink equals one 12 oz bottle of beer (355 mL), one 5 oz glass of wine (148 mL), or one 1½ oz glass of hard liquor (44 mL).        •Learn to deal with stress. If you need help, ask your doctor.        Body care      •Stay up to date with your shots (immunizations).      •Have your eyes and feet checked by a doctor as often as told.      •Check your skin and feet every day. Check for cuts, bruises, redness, blisters, or sores.      •Brush your teeth and gums two times a day. Floss one or more times a day.      •Go to the dentist one or more times every 6 months.      •Stay at a healthy weight.      General instructions   •Share your diabetes care plan with:  •Your work or school.      •People you live with.        •Carry a card or wear jewelry that says you have diabetes.      •Keep all follow-up visits as told by your doctor. This is important.        Questions to ask your doctor    •Do I need to meet with a certified expert in diabetes education and care?      •Where can I find a support group?        Where to find more information    •American Diabetes Association: www.diabetes.org      •American Association of Diabetes Care and Education Specialists: www.diabeteseducator.org      •International Diabetes Federation: www.idf.org        Summary    •When you have type 2 diabetes, you must make sure your blood sugar (glucose) stays in a healthy range. You can do this with nutrition, exercise, medicines and insulin, and support from doctors and others.      •Check your blood sugar every day, as often as told.      •Having diabetes can raise your risk for other long-term health problems. You may get medicines to help prevent these problems.      •Share your diabetes management plan with people at work, school, and home.      •Keep all follow-up visits as told by your doctor. This is important.

## 2021-09-09 NOTE — ED CDU PROVIDER SUBSEQUENT DAY NOTE - MEDICAL DECISION MAKING DETAILS
54yo M with no PMH presents to the ED c/o increased thirst, blurry vision and weight loss over the course of the past 2 weeks.    New onset Type 2 DM- family hx of DM, never been diagnosed last saw PCP 2 years ago. Initial BS of 318, anion gap of 21, small acetone, PH 7.3. Improvement of sugars and anion gap, hemoglobin a1c of 10.7, Seen by ICU, no need for insulin drip at this time, Recommended 15 units of lantus q nightly, will place on sliding scale, DM education, patient to follow up with endocrinology and ophthalmology and Podiatry following discharge, finger stick q 4 hours, repeat bmp in the am, continue on lactated ringers

## 2021-09-13 PROBLEM — Z00.00 ENCOUNTER FOR PREVENTIVE HEALTH EXAMINATION: Status: ACTIVE | Noted: 2021-09-13

## 2021-09-23 ENCOUNTER — RESULT CHARGE (OUTPATIENT)
Age: 56
End: 2021-09-23

## 2021-09-23 ENCOUNTER — APPOINTMENT (OUTPATIENT)
Dept: ENDOCRINOLOGY | Facility: CLINIC | Age: 56
End: 2021-09-23
Payer: SELF-PAY

## 2021-09-23 VITALS
SYSTOLIC BLOOD PRESSURE: 134 MMHG | HEART RATE: 86 BPM | OXYGEN SATURATION: 96 % | DIASTOLIC BLOOD PRESSURE: 76 MMHG | HEIGHT: 69 IN | BODY MASS INDEX: 26.96 KG/M2 | TEMPERATURE: 97.2 F | WEIGHT: 182 LBS

## 2021-09-23 DIAGNOSIS — Z63.5 DISRUPTION OF FAMILY BY SEPARATION AND DIVORCE: ICD-10-CM

## 2021-09-23 DIAGNOSIS — Z82.49 FAMILY HISTORY OF ISCHEMIC HEART DISEASE AND OTHER DISEASES OF THE CIRCULATORY SYSTEM: ICD-10-CM

## 2021-09-23 DIAGNOSIS — Z83.3 FAMILY HISTORY OF DIABETES MELLITUS: ICD-10-CM

## 2021-09-23 DIAGNOSIS — E78.1 PURE HYPERGLYCERIDEMIA: ICD-10-CM

## 2021-09-23 DIAGNOSIS — Z78.9 OTHER SPECIFIED HEALTH STATUS: ICD-10-CM

## 2021-09-23 DIAGNOSIS — Z80.1 FAMILY HISTORY OF MALIGNANT NEOPLASM OF TRACHEA, BRONCHUS AND LUNG: ICD-10-CM

## 2021-09-23 LAB — GLUCOSE BLDC GLUCOMTR-MCNC: 143

## 2021-09-23 PROCEDURE — 99215 OFFICE O/P EST HI 40 MIN: CPT | Mod: 25

## 2021-09-23 PROCEDURE — 82962 GLUCOSE BLOOD TEST: CPT

## 2021-09-23 SDOH — SOCIAL STABILITY - SOCIAL INSECURITY: DISRUPTION OF FAMILY BY SEPARATION AND DIVORCE: Z63.5

## 2021-09-23 NOTE — REVIEW OF SYSTEMS
[Blurred Vision] : blurred vision [As Noted in HPI] : as noted in HPI [Chest Pain] : no chest pain [Shortness Of Breath] : no shortness of breath [SOB on Exertion] : no shortness of breath on exertion [Nausea] : no nausea [Abdominal Pain] : no abdominal pain [Diarrhea] : no diarrhea [Pain/Numbness of Digits] : no pain/numbness of digits [Depression] : no depression [Anxiety] : no anxiety [FreeTextEntry2] : initial 10 pound weight loss and gained 2 pounds since hospital

## 2021-09-23 NOTE — HISTORY OF PRESENT ILLNESS
[FreeTextEntry1] : Quality: Type 2\par Severity: uncontrolled\par Duration/Onset: Presented to [facility] ER 9/9/21 with symptoms of increased thirst and frequent urination for 2 weeks and found to have new onset DM with DKA (HbA1c 10.7%). He was treated with IV fluids and insulin drip.  Then started on basal/bolus insulin therapy.  No prior history of diabetes but also no recent medical follow up either. Seen by Dr. Coronado in hospital. \par \par Interval Hx: feels better, decreased urinary sx and thirst. recent blurry vision. has been adherent with insulin.  \par \par MODIFYING FACTORS: (+) strong family history of diabetes - father, uncle and brother\par Lifestyle: since hospitalization - eats 3 meals daily, more salad and protein. stopped carbs. no sugar drinks. walks after dinner. \par Medication history: none\par Current DM meds: Lantus (pen) 20 units at bedtime. Humalog (pen) 4 units premeals.\par \par SMBG: lowest FS 90, testing 1-4x daily. -110's. Denies hypoglycemic symptoms.\par

## 2021-09-23 NOTE — ASSESSMENT
[Long Term Vascular Complications] : long term vascular complications of diabetes [Carbohydrate Consistent Diet] : carbohydrate consistent diet [Importance of Diet and Exercise] : importance of diet and exercise to improve glycemic control, achieve weight loss and improve cardiovascular health [Hypoglycemia Management] : hypoglycemia management [Self Monitoring of Blood Glucose] : self monitoring of blood glucose [Injection Technique, Storage, Sharps Disposal] : injection technique, storage, and sharps disposal [Retinopathy Screening] : Patient was referred to ophthalmology for retinopathy screening [FreeTextEntry1] : 55 year old male with \par 1. newly Dx T2DM s/p hospitalization for DKA and hyperglycemia. Normal Cpeptide at that time.  GAD65 and ICA not done.  Glucoses and symptoms markedly improved with dietary changes and insulin therapy\par We also discussed long term microvascular and macrovascular complication and discussed the importance of good glycemic control to help prevent these complication.   I counseled him on the importance of low carb/low sugar diet along with medication and regular exercise (30 min, 5 days/week of moderate intensity) to help achieve glycemic goals. I reviewed with him glycemic targets (A1c <7%, Fasting glucose 's, Daytime FS <150's).  I also educated him of signs/symptoms/treatment of hypoglycemia (educational material provided).  We reviewed importance of SMBG and educated him on proper insulin injection technique (priming the pen, rotation of injection sites)\par - must test FS 4x daily, rotate times, bring logs to visits\par - attempt to taper off insulin and start oral meds. decrease Lantus 15 units at bedtime, start  mg BID (r/b discussed, start w 1 tab after dinner then increase to 1 tabs after BF and Dinner after 7 days), Humalog scale  0 units, 151-200 2 units, 201- 250 4 units, 251-300 6 units, 300+ 8 units\par - repeat labs in 1 month, also check GAD65 ab and ICA (pt self pay but will have insurance in 1 month)\par - RTP CDE, dietary education\par - cont lifestyle changes\par \par 2. h/o hypertriglyceridemia - check fasting lipid panel\par

## 2021-09-23 NOTE — PHYSICAL EXAM
[Alert] : alert [Healthy Appearance] : healthy appearance [EOMI] : extra ocular movement intact [No LAD] : no lymphadenopathy [No Thyroid Nodules] : no palpable thyroid nodules [No Accessory Muscle Use] : no accessory muscle use [Clear to Auscultation] : lungs were clear to auscultation bilaterally [Normal S1, S2] : normal S1 and S2 [Normal Rate] : heart rate was normal [No Edema] : no peripheral edema [Not Tender] : non-tender [Soft] : abdomen soft [Normal Gait] : normal gait [2+] : 2+ in the dorsalis pedis [Oriented x3] : oriented to person, place, and time [Normal Affect] : the affect was normal [Normal Insight/Judgement] : insight and judgment were intact [Normal Mood] : the mood was normal [Acanthosis Nigricans] : no acanthosis nigricans [Foot Ulcers] : no foot ulcers [Vibration Dec.] : normal vibratory sensation at the level of the toes [Position Sense Dec.] : normal position sense at the level of the toes [Diminished Throughout Both Feet] : normal tactile sensation with monofilament testing throughout both feet

## 2021-11-17 LAB — HBA1C MFR BLD HPLC: 10.7

## 2021-11-18 ENCOUNTER — RESULT CHARGE (OUTPATIENT)
Age: 56
End: 2021-11-18

## 2021-11-18 ENCOUNTER — APPOINTMENT (OUTPATIENT)
Dept: ENDOCRINOLOGY | Facility: CLINIC | Age: 56
End: 2021-11-18
Payer: COMMERCIAL

## 2021-11-18 VITALS
SYSTOLIC BLOOD PRESSURE: 132 MMHG | HEART RATE: 77 BPM | TEMPERATURE: 97.5 F | BODY MASS INDEX: 25.92 KG/M2 | OXYGEN SATURATION: 97 % | WEIGHT: 175 LBS | HEIGHT: 69 IN | DIASTOLIC BLOOD PRESSURE: 80 MMHG

## 2021-11-18 LAB — GLUCOSE BLDC GLUCOMTR-MCNC: 93

## 2021-11-18 PROCEDURE — 82962 GLUCOSE BLOOD TEST: CPT

## 2021-11-18 PROCEDURE — 99214 OFFICE O/P EST MOD 30 MIN: CPT | Mod: 25

## 2021-11-18 RX ORDER — INSULIN LISPRO 100 [IU]/ML
100 INJECTION, SOLUTION INTRAVENOUS; SUBCUTANEOUS 3 TIMES DAILY
Refills: 0 | Status: DISCONTINUED | COMMUNITY
End: 2021-11-18

## 2021-11-18 NOTE — HISTORY OF PRESENT ILLNESS
[FreeTextEntry1] : Quality: Type 2\par Severity: uncontrolled\par Duration/Onset: Presented to [facility] ER 9/9/21 with symptoms of increased thirst and frequent urination for 2 weeks and found to have new onset DM with DKA (HbA1c 10.7%). He was treated with IV fluids and insulin drip.  Then started on basal/bolus insulin therapy.  No prior history of diabetes but also no recent medical follow up either. Seen by Dr. Coronado in hospital. \par \par MODIFYING FACTORS: (+) strong family history of diabetes - father, uncle and brother\par Lifestyle: since hospitalization - eats 3 meals daily, more salad and protein. stopped carbs. no sugar drinks. walks after dinner. \par Medication history: none\par Current DM meds: Lantus (pen) 15 units at bedtime. off Humalog (pen), On  mg BID\par \par SMBG: am 100-110, predinner 80-90's, after dinner 120's. headache midday.  \par FS okay when he stopped insulin for 2 days\par

## 2021-11-18 NOTE — ASSESSMENT
[Long Term Vascular Complications] : long term vascular complications of diabetes [Carbohydrate Consistent Diet] : carbohydrate consistent diet [Importance of Diet and Exercise] : importance of diet and exercise to improve glycemic control, achieve weight loss and improve cardiovascular health [Hypoglycemia Management] : hypoglycemia management [Self Monitoring of Blood Glucose] : self monitoring of blood glucose [Injection Technique, Storage, Sharps Disposal] : injection technique, storage, and sharps disposal [Retinopathy Screening] : Patient was referred to ophthalmology for retinopathy screening [FreeTextEntry1] : 55 year old male with \par 1. newly Dx T2DM s/p hospitalization for DKA and hyperglycemia. Normal Cpeptide at that time.  GAD65 and ICA not done.  Glucoses and symptoms markedly improved with dietary changes and insulin therapy. Now with lower FS midday 80-90's w sx of headaches, pt concerned that he is not eating enough\par - stop Lantus, cont  mg BID\par - must test FS 4x daily, rotate times, send logs in 2 weeks and bring logs to visits\par - repeat labs needed, see below\par - RTO CDE, dietary education, keep diet log\par - cont lifestyle changes\par \par 2. h/o hypertriglyceridemia - check fasting lipid panel\par

## 2021-11-18 NOTE — PHYSICAL EXAM
[Alert] : alert [Healthy Appearance] : healthy appearance [EOMI] : extra ocular movement intact [No LAD] : no lymphadenopathy [No Thyroid Nodules] : no palpable thyroid nodules [No Accessory Muscle Use] : no accessory muscle use [Clear to Auscultation] : lungs were clear to auscultation bilaterally [Normal S1, S2] : normal S1 and S2 [Normal Rate] : heart rate was normal [No Edema] : no peripheral edema [Not Tender] : non-tender [Soft] : abdomen soft [Normal Gait] : normal gait [Oriented x3] : oriented to person, place, and time [Normal Affect] : the affect was normal [Normal Insight/Judgement] : insight and judgment were intact [Normal Mood] : the mood was normal [Acanthosis Nigricans] : no acanthosis nigricans

## 2021-11-18 NOTE — REVIEW OF SYSTEMS
[As Noted in HPI] : as noted in HPI [Recent Weight Loss (___ Lbs)] : recent weight loss: [unfilled] lbs [Blurred Vision] : no blurred vision [Chest Pain] : no chest pain [Palpitations] : palpitations [Shortness Of Breath] : no shortness of breath [SOB on Exertion] : no shortness of breath on exertion [Nausea] : no nausea [Abdominal Pain] : no abdominal pain [Diarrhea] : no diarrhea [Polyuria] : no polyuria [Nocturia] : no nocturia [Headaches] : headaches [Pain/Numbness of Digits] : no pain/numbness of digits [Depression] : no depression [Anxiety] : no anxiety

## 2021-12-03 ENCOUNTER — APPOINTMENT (OUTPATIENT)
Dept: ENDOCRINOLOGY | Facility: CLINIC | Age: 56
End: 2021-12-03

## 2022-02-11 ENCOUNTER — APPOINTMENT (OUTPATIENT)
Dept: ENDOCRINOLOGY | Facility: CLINIC | Age: 57
End: 2022-02-11

## 2022-03-29 ENCOUNTER — RX RENEWAL (OUTPATIENT)
Age: 57
End: 2022-03-29

## 2022-06-02 ENCOUNTER — RESULT CHARGE (OUTPATIENT)
Age: 57
End: 2022-06-02

## 2022-06-02 ENCOUNTER — APPOINTMENT (OUTPATIENT)
Dept: ENDOCRINOLOGY | Facility: CLINIC | Age: 57
End: 2022-06-02
Payer: COMMERCIAL

## 2022-06-02 VITALS
HEART RATE: 79 BPM | DIASTOLIC BLOOD PRESSURE: 80 MMHG | WEIGHT: 175 LBS | BODY MASS INDEX: 25.92 KG/M2 | HEIGHT: 69 IN | SYSTOLIC BLOOD PRESSURE: 130 MMHG

## 2022-06-02 DIAGNOSIS — Z79.899 OTHER LONG TERM (CURRENT) DRUG THERAPY: ICD-10-CM

## 2022-06-02 LAB — GLUCOSE BLDC GLUCOMTR-MCNC: 109

## 2022-06-02 PROCEDURE — 82962 GLUCOSE BLOOD TEST: CPT

## 2022-06-02 PROCEDURE — 99214 OFFICE O/P EST MOD 30 MIN: CPT | Mod: 25

## 2022-06-02 RX ORDER — PEN NEEDLE, DIABETIC 32GX 5/32"
32G X 4 MM NEEDLE, DISPOSABLE MISCELLANEOUS
Qty: 4 | Refills: 1 | Status: DISCONTINUED | COMMUNITY
Start: 2021-11-12 | End: 2022-06-02

## 2022-06-02 RX ORDER — INSULIN GLARGINE 100 [IU]/ML
100 INJECTION, SOLUTION SUBCUTANEOUS
Qty: 1 | Refills: 0 | Status: DISCONTINUED | COMMUNITY
Start: 1900-01-01 | End: 2022-06-02

## 2022-06-02 NOTE — PHYSICAL EXAM
[Alert] : alert [No Acute Distress] : no acute distress [EOMI] : extra ocular movement intact [Thyroid Not Enlarged] : the thyroid was not enlarged [No Thyroid Nodules] : no palpable thyroid nodules [No Accessory Muscle Use] : no accessory muscle use [Clear to Auscultation] : lungs were clear to auscultation bilaterally [Normal S1, S2] : normal S1 and S2 [Normal Rate] : heart rate was normal [No Edema] : no peripheral edema [Not Tender] : non-tender [Soft] : abdomen soft [Normal Gait] : normal gait [Oriented x3] : oriented to person, place, and time [Normal Affect] : the affect was normal [Normal Insight/Judgement] : insight and judgment were intact [Normal Mood] : the mood was normal

## 2022-06-02 NOTE — ASSESSMENT
[FreeTextEntry1] : 55 year old male with \par 1. newly Dx T2DM s/p hospitalization for DKA and hyperglycemia. Normal C-peptide at that time.  GAD65 and ICA not done.  ?t1DM in honeymoon phase vs T2DM -has been off insulin\par - check cpeptide, insulin, GAD65 ab, ICA\par - cont  mg BID\par - cont SMBG, call with highs/lows\par - repeat labs needed, see below\par - cont lifestyle changes\par \par 2. h/o hypertriglyceridemia - check fasting lipid panel\par \par will advise further based on results\par

## 2022-06-02 NOTE — REVIEW OF SYSTEMS
[Recent Weight Gain (___ Lbs)] : no recent weight gain [Recent Weight Loss (___ Lbs)] : no recent weight loss [Blurred Vision] : no blurred vision [Chest Pain] : no chest pain [Shortness Of Breath] : no shortness of breath [Nausea] : no nausea [Abdominal Pain] : no abdominal pain [Diarrhea] : no diarrhea [Polyuria] : no polyuria [Nocturia] : no nocturia [Pain/Numbness of Digits] : no pain/numbness of digits [Polydipsia] : no polydipsia

## 2022-06-02 NOTE — HISTORY OF PRESENT ILLNESS
[FreeTextEntry1] : Interval Hx - last seen 11/2021, doing well. no issues, no changed. did not have labs done\par \par Quality: Type 2\par Severity: uncontrolled\par Duration/Onset: Presented to [facility] ER 9/9/21 with symptoms of increased thirst and frequent urination for 2 weeks and found to have new onset DM with DKA (HbA1c 10.7%). He was treated with IV fluids and insulin drip.  Then started on basal/bolus insulin therapy.  No prior history of diabetes but also no recent medical follow up either. Seen by Dr. Coronado in hospital. \par \par MODIFYING FACTORS: (+) strong family history of diabetes - father, uncle and brother, improved with diet and exercise\par Lifestyle: since hospitalization - eats 3 meals daily, more salad and protein. stopped carbs. no sugar drinks. walks after dinner. \par Medication history: none\par Current DM meds: On  mg BID\par \par SMBG: per pt FS 's, 120 after pasta\par

## 2022-06-13 ENCOUNTER — APPOINTMENT (OUTPATIENT)
Dept: FAMILY MEDICINE | Facility: CLINIC | Age: 57
End: 2022-06-13

## 2022-08-18 NOTE — ED PROVIDER NOTE - CRTICAL CARE TIME SPENT (MIN)
Impression: Primary open-angle glaucoma, bilateral, severe stage. Code: R52.6851. Hx of irregular and fast heart beats ALT OS 2016 ALT OS 10/19/17 [address] OS 5/4/18 SLT OS 9/10/21 Plan: Discussed diagnosis, explained and understood by patient. Discussed IOP/ONH/Glaucoma management and risks. VF and OCT ordered and reviewed today shows progression OD, stable OS. Continue with Dorzolamide-timolol bid ou, and lumigan qhs ou. Discussed adding drops vs laser. Patient elects SLT. Recommend Trabeculoplasty (SLT) OD to possibly lower IOP. Discussed RBA's of laser trabeculoplasty .  RL=2 45

## 2022-09-02 ENCOUNTER — APPOINTMENT (OUTPATIENT)
Dept: ENDOCRINOLOGY | Facility: CLINIC | Age: 57
End: 2022-09-02

## 2023-03-01 ENCOUNTER — APPOINTMENT (OUTPATIENT)
Dept: ENDOCRINOLOGY | Facility: CLINIC | Age: 58
End: 2023-03-01
Payer: COMMERCIAL

## 2023-03-01 ENCOUNTER — RESULT CHARGE (OUTPATIENT)
Age: 58
End: 2023-03-01

## 2023-03-01 VITALS
BODY MASS INDEX: 26.96 KG/M2 | DIASTOLIC BLOOD PRESSURE: 84 MMHG | SYSTOLIC BLOOD PRESSURE: 140 MMHG | WEIGHT: 182 LBS | HEIGHT: 69 IN | HEART RATE: 87 BPM

## 2023-03-01 LAB — GLUCOSE BLDC GLUCOMTR-MCNC: 105

## 2023-03-01 PROCEDURE — 99214 OFFICE O/P EST MOD 30 MIN: CPT | Mod: 25

## 2023-03-01 PROCEDURE — 82962 GLUCOSE BLOOD TEST: CPT

## 2023-03-01 NOTE — HISTORY OF PRESENT ILLNESS
[FreeTextEntry1] : Interval Hx - last seen 6/2022, did not have labs done, feels well, no changes, no hospitalizations\par \par Quality: Type 2\par Severity: uncontrolled\par Duration/Onset: Presented to [facility] ER 9/9/21 with symptoms of increased thirst and frequent urination for 2 weeks and found to have new onset DM with DKA (HbA1c 10.7%). He was treated with IV fluids and insulin drip.  Then started on basal/bolus insulin therapy.  No prior history of diabetes but also no recent medical follow up either. Seen by Dr. Coronado in hospital. \par \par MODIFYING FACTORS: (+) strong family history of diabetes - father, uncle and brother, improved with diet and exercise\par Lifestyle: trying to watch diet, occ non-adherence with diet\par Medication history: none\par Current DM meds: On  mg BID\par \par SMBG: testing 1-2x/week, per pt FS mid 90's\par

## 2023-03-01 NOTE — REVIEW OF SYSTEMS
[Recent Weight Gain (___ Lbs)] : recent weight gain: [unfilled] lbs [Blurred Vision] : no blurred vision [Chest Pain] : no chest pain [Shortness Of Breath] : no shortness of breath [Nausea] : no nausea [Abdominal Pain] : no abdominal pain [Diarrhea] : no diarrhea [Polyuria] : no polyuria [Nocturia] : no nocturia [Pain/Numbness of Digits] : no pain/numbness of digits [Polydipsia] : no polydipsia

## 2023-03-01 NOTE — ASSESSMENT
[FreeTextEntry1] : 57 year old male with \par 1.  T2DM s/p hospitalization for DKA and hyperglycemia. Normal C-peptide at that time.  GAD65 and ICA not done.  ?t1DM in honeymoon phase vs T2DM -has been off insulin\par - check cpeptide, insulin, GAD65 ab, ICA\par - cont  mg BID\par - cont SMBG, call with highs/lows\par - repeat labs needed, see below\par - cont lifestyle changes\par - eye exam needed\par - check B12 level on MFN\par \par 2. h/o hypertriglyceridemia - check fasting lipid panel\par \par \par pt has not has labs since 2021, explained importance of lab monitoring of kidney/liver function and diabetes control. will also need to repeat pancreatic testing given history\par

## 2023-03-28 ENCOUNTER — NON-APPOINTMENT (OUTPATIENT)
Age: 58
End: 2023-03-28

## 2023-04-28 ENCOUNTER — NON-APPOINTMENT (OUTPATIENT)
Age: 58
End: 2023-04-28

## 2023-06-07 LAB
HBA1C MFR BLD HPLC: 5.7
LDLC SERPL DIRECT ASSAY-MCNC: 51
MICROALBUMIN/CREAT 24H UR-RTO: 21

## 2023-06-08 ENCOUNTER — APPOINTMENT (OUTPATIENT)
Dept: ENDOCRINOLOGY | Facility: CLINIC | Age: 58
End: 2023-06-08
Payer: COMMERCIAL

## 2023-06-08 ENCOUNTER — RESULT CHARGE (OUTPATIENT)
Age: 58
End: 2023-06-08

## 2023-06-08 VITALS
BODY MASS INDEX: 26.96 KG/M2 | SYSTOLIC BLOOD PRESSURE: 126 MMHG | WEIGHT: 182 LBS | HEART RATE: 86 BPM | OXYGEN SATURATION: 99 % | DIASTOLIC BLOOD PRESSURE: 73 MMHG | HEIGHT: 69 IN

## 2023-06-08 LAB — GLUCOSE BLDC GLUCOMTR-MCNC: 91

## 2023-06-08 PROCEDURE — 82962 GLUCOSE BLOOD TEST: CPT

## 2023-06-08 PROCEDURE — 99214 OFFICE O/P EST MOD 30 MIN: CPT | Mod: 25

## 2023-06-08 RX ORDER — BLOOD SUGAR DIAGNOSTIC
STRIP MISCELLANEOUS
Qty: 2 | Refills: 3 | Status: ACTIVE | COMMUNITY
Start: 1900-01-01 | End: 1900-01-01

## 2023-06-08 RX ORDER — METFORMIN HYDROCHLORIDE 500 MG/1
500 TABLET, COATED ORAL
Qty: 180 | Refills: 0 | Status: DISCONTINUED | COMMUNITY
Start: 2021-09-23 | End: 2023-06-08

## 2023-06-08 NOTE — HISTORY OF PRESENT ILLNESS
[FreeTextEntry1] : Interval Hx - no issues, no chnages\par long term hx of hyperTrig without pancreatitis, had been on fibrate in past\par \par Quality: Type 2\par Severity: uncontrolled\par Duration/Onset: Presented to [facility] ER 9/9/21 with symptoms of increased thirst and frequent urination for 2 weeks and found to have new onset DM with DKA (HbA1c 10.7%). He was treated with IV fluids and insulin drip.  Then started on basal/bolus insulin therapy.  No prior history of diabetes but also no recent medical follow up either. Seen by Dr. Coronado in hospital. \par \par MODIFYING FACTORS: (+) strong family history of diabetes - father, uncle and brother, improved with diet and exercise\par Lifestyle: adhering with diabetic diet\par Medication history: none\par Current DM meds: On  mg BID\par \par No recent eye exam. 2023 neg urine alb/Cr\par \par SMBG: testing 1-2x/week, per pt FS mid 80-90's, FS 90's\par

## 2023-06-08 NOTE — DATA REVIEWED
[FreeTextEntry1] : Labs 9/8/21\par Cr 0.90, GFR 95\par A1c 10.7\par Ceptide 3.3 with glucose 241\par \par Labs 4/14/23\par CBC wnl\par Gluc 121, A1c 5.7\par Cr 1.13, GFR 75\par Trig 471 HDL 24 LDL 51\par proinsulin 10.8, insulin 25, cpeptide 6.1\par AVELINA 65 neg\par TSH 3.370\par B12 299\par urine alb/Cr 21

## 2023-06-08 NOTE — PHYSICAL EXAM
[Alert] : alert [No Acute Distress] : no acute distress [EOMI] : extra ocular movement intact [Thyroid Not Enlarged] : the thyroid was not enlarged [No Thyroid Nodules] : no palpable thyroid nodules [Clear to Auscultation] : lungs were clear to auscultation bilaterally [Normal S1, S2] : normal S1 and S2 [Normal Rate] : heart rate was normal [No Edema] : no peripheral edema [Not Tender] : non-tender [Soft] : abdomen soft [Normal Gait] : normal gait [Oriented x3] : oriented to person, place, and time [Normal Affect] : the affect was normal [Normal Insight/Judgement] : insight and judgment were intact [Normal Mood] : the mood was normal [No Respiratory Distress] : no respiratory distress

## 2023-06-08 NOTE — ASSESSMENT
[FreeTextEntry1] : 57 year old male with \par 1.  T2DM s/p hospitalization for DKA and hyperglycemia. Labs confirmed T2DM, glucoses improved and A1c down to 5.7%\par - try atopping MFN\par - cont SMBG, call with highs/lows\par - repeat labs in 3 months\par - cont lifestyle changes\par - eye exam needed\par \par 2. hypertriglyceridemia - watch diet, start vascepa, monitor labs\par

## 2023-06-14 RX ORDER — ICOSAPENT ETHYL 1 G/1
1 CAPSULE ORAL
Qty: 180 | Refills: 1 | Status: DISCONTINUED | COMMUNITY
Start: 2023-06-08 | End: 2023-06-14

## 2023-07-25 ENCOUNTER — RX RENEWAL (OUTPATIENT)
Age: 58
End: 2023-07-25

## 2023-09-29 ENCOUNTER — APPOINTMENT (OUTPATIENT)
Dept: ENDOCRINOLOGY | Facility: CLINIC | Age: 58
End: 2023-09-29

## 2023-10-13 ENCOUNTER — APPOINTMENT (OUTPATIENT)
Dept: ENDOCRINOLOGY | Facility: CLINIC | Age: 58
End: 2023-10-13
Payer: COMMERCIAL

## 2023-10-13 VITALS
DIASTOLIC BLOOD PRESSURE: 82 MMHG | HEART RATE: 80 BPM | SYSTOLIC BLOOD PRESSURE: 132 MMHG | WEIGHT: 183 LBS | BODY MASS INDEX: 27.11 KG/M2 | HEIGHT: 69 IN

## 2023-10-13 LAB — GLUCOSE BLDC GLUCOMTR-MCNC: 124

## 2023-10-13 PROCEDURE — 82962 GLUCOSE BLOOD TEST: CPT

## 2023-10-13 PROCEDURE — 99214 OFFICE O/P EST MOD 30 MIN: CPT | Mod: 25

## 2024-01-12 ENCOUNTER — APPOINTMENT (OUTPATIENT)
Dept: ENDOCRINOLOGY | Facility: CLINIC | Age: 59
End: 2024-01-12

## 2024-05-24 LAB
HBA1C MFR BLD HPLC: 5.9
LDLC SERPL DIRECT ASSAY-MCNC: 46
MICROALBUMIN/CREAT 24H UR-RTO: 11

## 2024-05-28 ENCOUNTER — APPOINTMENT (OUTPATIENT)
Dept: ENDOCRINOLOGY | Facility: CLINIC | Age: 59
End: 2024-05-28
Payer: COMMERCIAL

## 2024-05-28 VITALS
WEIGHT: 186 LBS | DIASTOLIC BLOOD PRESSURE: 84 MMHG | HEART RATE: 79 BPM | OXYGEN SATURATION: 95 % | SYSTOLIC BLOOD PRESSURE: 140 MMHG | BODY MASS INDEX: 27.55 KG/M2 | HEIGHT: 69 IN

## 2024-05-28 VITALS — DIASTOLIC BLOOD PRESSURE: 82 MMHG | SYSTOLIC BLOOD PRESSURE: 132 MMHG

## 2024-05-28 DIAGNOSIS — E78.5 HYPERLIPIDEMIA, UNSPECIFIED: ICD-10-CM

## 2024-05-28 DIAGNOSIS — E78.1 PURE HYPERGLYCERIDEMIA: ICD-10-CM

## 2024-05-28 DIAGNOSIS — E11.65 TYPE 2 DIABETES MELLITUS WITH HYPERGLYCEMIA: ICD-10-CM

## 2024-05-28 LAB — GLUCOSE BLDC GLUCOMTR-MCNC: 109

## 2024-05-28 PROCEDURE — 99214 OFFICE O/P EST MOD 30 MIN: CPT

## 2024-05-28 PROCEDURE — 82962 GLUCOSE BLOOD TEST: CPT

## 2024-05-28 RX ORDER — ATORVASTATIN CALCIUM 10 MG/1
10 TABLET, FILM COATED ORAL
Qty: 90 | Refills: 0 | Status: DISCONTINUED | COMMUNITY
Start: 2023-06-14 | End: 2024-05-28

## 2024-05-28 NOTE — REVIEW OF SYSTEMS
[Recent Weight Gain (___ Lbs)] : recent weight gain: [unfilled] lbs [Blurred Vision] : no blurred vision [Chest Pain] : no chest pain [Shortness Of Breath] : no shortness of breath [Nausea] : no nausea [Abdominal Pain] : no abdominal pain [Polyuria] : no polyuria [Nocturia] : no nocturia [Pain/Numbness of Digits] : no pain/numbness of digits [Depression] : no depression [Anxiety] : no anxiety [Polydipsia] : no polydipsia

## 2024-05-28 NOTE — DATA REVIEWED
[FreeTextEntry1] : Labs 9/8/21 Cr 0.90, GFR 95 A1c 10.7 Ceptide 3.3 with glucose 241  Labs 4/14/23 CBC wnl Gluc 121, A1c 5.7 Cr 1.13, GFR 75 Trig 471 HDL 24 LDL 51 proinsulin 10.8, insulin 25, cpeptide 6.1 AVELINA 65 neg TSH 3.370 B12 299 urine alb/Cr 21  Labs 3/12/24 Gluc 132, A1c 5.9 Cr 1.02, GFR 85 Trig 690, LDL 54 urine alb/cr 11 TSH 3.670 B12 456

## 2024-05-28 NOTE — ASSESSMENT
[FreeTextEntry1] : 58 yr old male with: 1. T2DM initially presented with COVID and DKA/hyperglycemia with A1c 10.7%. Labs confirmed T2DM, glucoses improved and A1c down to 5.7% and MFN stopped. A1c 5.9% - cont exercise - cont low carb diet - eye exam needed - will monitor labs  2. severe hypertriglyceridemia - insurance will not cover vascepa until statin tried, diet review - high in butter, ran out statin and did not resume; likely familial - repeat labs needed, consdier fibrate or statin - counseled pt on very low fat diet in addition to low carb diet, avoid red meat, try and have more omega 3 from diet with foods like salmon - risks of pancreatitis discussed .

## 2024-05-28 NOTE — PHYSICAL EXAM
[Alert] : alert [No Acute Distress] : no acute distress [EOMI] : extra ocular movement intact [No Respiratory Distress] : no respiratory distress [Clear to Auscultation] : lungs were clear to auscultation bilaterally [Normal S1, S2] : normal S1 and S2 [Normal Rate] : heart rate was normal [No Edema] : no peripheral edema [Pedal Pulses Normal] : the pedal pulses are present [2+] : 2+ in the dorsalis pedis [Oriented x3] : oriented to person, place, and time [Normal Affect] : the affect was normal [Normal Insight/Judgement] : insight and judgment were intact [Normal Mood] : the mood was normal [Vibration Dec.] : normal vibratory sensation at the level of the toes [Diminished Throughout Both Feet] : normal tactile sensation with monofilament testing throughout both feet [Thyroid Not Enlarged] : the thyroid was not enlarged [No Thyroid Nodules] : no palpable thyroid nodules [Not Tender] : non-tender [Soft] : abdomen soft

## 2024-05-28 NOTE — HISTORY OF PRESENT ILLNESS
[FreeTextEntry1] : Interval Hx - feels well  Diet: Bf eggs wheat toast, lunch turkey sandwich, dinner homecookes meals  long term hx of hyperTrig without pancreatitis, had been on fibrate in past - insurance did not cover vascepa until statin started. started statin but then ran out  Quality: Type 2 Severity: uncontrolled Duration/Onset: Presented to [facility] ER 9/9/21 with symptoms of increased thirst and frequent urination for 2 weeks and found to have new onset DM with DKA (HbA1c 10.7%). He was treated with IV fluids and insulin drip.  Then started on basal/bolus insulin therapy.  No prior history of diabetes but also no recent medical follow up either. Seen by Dr. Coronado in hospital.   MODIFYING FACTORS: (+) strong family history of diabetes - father, uncle and brother, improved with diet and exercise Lifestyle: adhering with diabetic diet Medication history: was on MFN  Current DM meds: nothing  Complications: No recent eye exam. 2023 neg urine alb/Cr  SMBG: testing infrequently FS around 100's

## 2024-08-30 ENCOUNTER — APPOINTMENT (OUTPATIENT)
Dept: ENDOCRINOLOGY | Facility: CLINIC | Age: 59
End: 2024-08-30